# Patient Record
Sex: FEMALE | Race: WHITE | Employment: OTHER | URBAN - METROPOLITAN AREA
[De-identification: names, ages, dates, MRNs, and addresses within clinical notes are randomized per-mention and may not be internally consistent; named-entity substitution may affect disease eponyms.]

---

## 2019-06-12 ENCOUNTER — HOSPITAL ENCOUNTER (OUTPATIENT)
Dept: MRI IMAGING | Age: 74
Discharge: HOME | End: 2019-06-12
Attending: INTERNAL MEDICINE
Payer: MEDICARE

## 2019-06-12 DIAGNOSIS — H53.9 VISION CHANGES: ICD-10-CM

## 2019-06-12 DIAGNOSIS — R51.9 NONINTRACTABLE HEADACHE, UNSPECIFIED CHRONICITY PATTERN, UNSPECIFIED HEADACHE TYPE: ICD-10-CM

## 2019-06-12 PROCEDURE — 70551 MRI BRAIN STEM W/O DYE: CPT

## 2019-06-13 PROBLEM — G44.89 OTHER HEADACHE SYNDROME: Status: ACTIVE | Noted: 2019-06-13

## 2019-07-05 ENCOUNTER — HOSPITAL ENCOUNTER (OUTPATIENT)
Dept: MRI IMAGING | Age: 74
Discharge: HOME | End: 2019-07-05
Attending: NURSE PRACTITIONER
Payer: MEDICARE

## 2019-07-05 DIAGNOSIS — H53.9 VISION DISTURBANCE: ICD-10-CM

## 2019-07-05 DIAGNOSIS — R90.82 WHITE MATTER DISEASE: ICD-10-CM

## 2019-07-05 DIAGNOSIS — G44.1 OTHER VASCULAR HEADACHE: ICD-10-CM

## 2019-07-05 DIAGNOSIS — R42 DIZZINESS: ICD-10-CM

## 2019-07-05 PROCEDURE — 70544 MR ANGIOGRAPHY HEAD W/O DYE: CPT

## 2020-05-22 ENCOUNTER — HOSPITAL ENCOUNTER (OUTPATIENT)
Dept: MAMMOGRAPHY | Age: 75
Discharge: HOME | End: 2020-05-22
Attending: INTERNAL MEDICINE
Payer: MEDICARE

## 2020-05-22 DIAGNOSIS — Z78.0 POSTMENOPAUSAL STATE: ICD-10-CM

## 2020-05-22 PROCEDURE — 77080 DXA BONE DENSITY AXIAL: CPT

## 2021-07-14 ENCOUNTER — HOSPITAL ENCOUNTER (OUTPATIENT)
Dept: CT IMAGING | Age: 76
Discharge: HOME | End: 2021-07-14
Attending: PSYCHIATRY & NEUROLOGY
Payer: MEDICARE

## 2021-07-14 DIAGNOSIS — I67.1 BRAIN ANEURYSM: ICD-10-CM

## 2021-07-14 LAB — CREAT BLD-MCNC: 0.75 MG/DL (ref 0.8–1.5)

## 2021-07-14 PROCEDURE — 82565 ASSAY OF CREATININE: CPT

## 2021-07-14 PROCEDURE — 74011000636 HC RX REV CODE- 636: Performed by: PSYCHIATRY & NEUROLOGY

## 2021-07-14 PROCEDURE — 74011000258 HC RX REV CODE- 258: Performed by: PSYCHIATRY & NEUROLOGY

## 2021-07-14 PROCEDURE — 70496 CT ANGIOGRAPHY HEAD: CPT

## 2021-07-14 RX ORDER — SODIUM CHLORIDE 0.9 % (FLUSH) 0.9 %
10 SYRINGE (ML) INJECTION
Status: COMPLETED | OUTPATIENT
Start: 2021-07-14 | End: 2021-07-14

## 2021-07-14 RX ADMIN — IOPAMIDOL 50 ML: 755 INJECTION, SOLUTION INTRAVENOUS at 14:05

## 2021-07-14 RX ADMIN — SODIUM CHLORIDE 100 ML: 900 INJECTION, SOLUTION INTRAVENOUS at 14:06

## 2021-07-14 RX ADMIN — Medication 10 ML: at 14:06

## 2022-03-19 PROBLEM — H53.9 VISION DISTURBANCE: Status: ACTIVE | Noted: 2019-07-05

## 2022-03-20 PROBLEM — G44.89 OTHER HEADACHE SYNDROME: Status: ACTIVE | Noted: 2019-06-13

## 2022-03-20 PROBLEM — R42 DIZZINESS: Status: ACTIVE | Noted: 2019-07-05

## 2022-10-04 ENCOUNTER — OFFICE VISIT (OUTPATIENT)
Dept: NEUROLOGY | Age: 77
End: 2022-10-04
Payer: MEDICARE

## 2022-10-04 VITALS
DIASTOLIC BLOOD PRESSURE: 73 MMHG | HEART RATE: 70 BPM | HEIGHT: 64 IN | SYSTOLIC BLOOD PRESSURE: 109 MMHG | BODY MASS INDEX: 20.66 KG/M2 | WEIGHT: 121 LBS

## 2022-10-04 DIAGNOSIS — G89.29 CHRONIC NONINTRACTABLE HEADACHE, UNSPECIFIED HEADACHE TYPE: Primary | ICD-10-CM

## 2022-10-04 DIAGNOSIS — R51.9 CHRONIC NONINTRACTABLE HEADACHE, UNSPECIFIED HEADACHE TYPE: Primary | ICD-10-CM

## 2022-10-04 DIAGNOSIS — I67.1 BRAIN ANEURYSM: ICD-10-CM

## 2022-10-04 PROCEDURE — 1090F PRES/ABSN URINE INCON ASSESS: CPT | Performed by: PSYCHIATRY & NEUROLOGY

## 2022-10-04 PROCEDURE — 3017F COLORECTAL CA SCREEN DOC REV: CPT | Performed by: PSYCHIATRY & NEUROLOGY

## 2022-10-04 PROCEDURE — G8427 DOCREV CUR MEDS BY ELIG CLIN: HCPCS | Performed by: PSYCHIATRY & NEUROLOGY

## 2022-10-04 PROCEDURE — 4004F PT TOBACCO SCREEN RCVD TLK: CPT | Performed by: PSYCHIATRY & NEUROLOGY

## 2022-10-04 PROCEDURE — G8420 CALC BMI NORM PARAMETERS: HCPCS | Performed by: PSYCHIATRY & NEUROLOGY

## 2022-10-04 PROCEDURE — 1123F ACP DISCUSS/DSCN MKR DOCD: CPT | Performed by: PSYCHIATRY & NEUROLOGY

## 2022-10-04 PROCEDURE — G8484 FLU IMMUNIZE NO ADMIN: HCPCS | Performed by: PSYCHIATRY & NEUROLOGY

## 2022-10-04 PROCEDURE — G8399 PT W/DXA RESULTS DOCUMENT: HCPCS | Performed by: PSYCHIATRY & NEUROLOGY

## 2022-10-04 PROCEDURE — 99214 OFFICE O/P EST MOD 30 MIN: CPT | Performed by: PSYCHIATRY & NEUROLOGY

## 2022-10-04 ASSESSMENT — ENCOUNTER SYMPTOMS
GASTROINTESTINAL NEGATIVE: 1
RESPIRATORY NEGATIVE: 1
EYES NEGATIVE: 1

## 2022-10-04 NOTE — PROGRESS NOTES
[facility], [address]  [place], [address]  Phone: [phone] Fax [phone]  Dr. Zandra Isaacs      10/4/2022  Jorge Maldonado     Patient is referred by the following provider for consultation regarding as below:       I reviewed the available records and notes and have examined patient with the following findings:     Chief Complaint:  Chief Complaint   Patient presents with    Migraine    Follow-up          HPI: This is a right handed 78 y.o.  female very pleasant very appropriate patient who unfortunately has had years of bilateral frontal headaches that started in October 2018 she was seen Dr. Fran Stewart one of the nurse practitioners for a while. She was getting 3 a month lasting about 1 minute. She had an MRI and MRA done that was she was told was \"normal\" but it really was not. She has had them to the point only one time it lasted 45 minutes. And it was a severe pain and and she lost consciousness. But since then she she started Topamax 25 mg twice a day. And that resolved her headaches they were gone so she discontinued her Topamax. And should these headaches were gone from November 2021 to May of this year and she has had a total of 12 since then. So about 1 to 2/month. They can last anywhere from 30 seconds to 1 minute and then are completely resolved. Bilateral frontal headaches behind her eyes throbbing cute onset. With the visual changes that occur with it. Where the central vision seems to be blurred out. 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 And [facility] and found it in 2019 with no change. So 1 from 20 19-20 21 there was no change to this lesion. We will probably wait about 3 to 4 years and recheck it. But she is doing very well overall and is asymptomatic.   Her sed rate and C-reactive protein have been normal.    IMAGING REVIEW:  I REVIEWED PERTINENT  IMAGES AND REPORTS WITH THE PATIENT PERSONALLY, DIRECTLY AND FULLY. Past Medical History:  Past Medical History:   Diagnosis Date    Cancer ([facility] [address]     breast CA left breast    Psychiatric disorder     anxiety    Thyroid disease        Past Surgical History:  Past Surgical History:   Procedure Laterality Date    BREAST SURGERY      lumpectemy left breast    GYN      hysterectomy    MASTECTOMY      with reconstruction of the left breast       Social History:  Social History     Socioeconomic History    Marital status:      Spouse name: Not on file    Number of children: Not on file    Years of education: Not on file    Highest education level: Not on file   Occupational History    Not on file   Tobacco Use    Smoking status: Former     Packs/day: 0.50     Types: Cigarettes     Quit date: 4/10/1978     Years since quittin.5    Smokeless tobacco: Never   Substance and Sexual Activity    Alcohol use: Yes    Drug use: No    Sexual activity: Not on file   Other Topics Concern    Not on file   Social History Narrative    Not on file     Social Determinants of Health     Financial Resource Strain: Not on file   Food Insecurity: Not on file   Transportation Needs: Not on file   Physical Activity: Not on file   Stress: Not on file   Social Connections: Not on file   Intimate Partner Violence: Not on file   Housing Stability: Not on file       Family History:   Family History   Problem Relation Age of Onset    Emergence Delirium Neg Hx     Post-op Nausea/Vomiting Neg Hx     Cancer Mother         ovarian    Pseudochol.  Deficiency Neg Hx     Delayed Awakening Neg Hx     Heart Disease Father     Diabetes Brother     Malig Hypertherm Neg Hx     Diabetes Mother     Heart Disease Mother        Current Outpatient Medications on File Prior to Visit   Medication Sig Dispense Refill    MAGNESIUM PO Take by mouth      levothyroxine (SYNTHROID) 175 MCG tablet Take 175 mcg by mouth every morning (before breakfast) 1/2 tab once daily      PARoxetine (PAXIL) 20 MG tablet Take 20 mg by mouth daily      pravastatin (PRAVACHOL) 10 MG tablet TAKE 1 TABLET BY MOUTH NIGHTLY       No current facility-administered medications on file prior to visit. Allergies   Allergen Reactions    Strawberry Extract Hives    Sulfamethoxazole-Trimethoprim Other (See Comments)     flushing    Sulfa Antibiotics Other (See Comments) and Hives    Ofloxacin Other (See Comments)     Not allergic per pt       Review of Systems:  Review of Systems   Constitutional: Negative. HENT: Negative. Eyes: Negative. Respiratory: Negative. Cardiovascular: Negative. Gastrointestinal: Negative. Endocrine: Negative. Genitourinary: Negative. Musculoskeletal: Negative. Skin: Negative. Neurological:  Positive for headaches. Psychiatric/Behavioral: Negative. No flowsheet data found. No flowsheet data found. Vitals:    10/04/22 1241   BP: 109/73   Site: Left Upper Arm   Position: Sitting   Pulse: 70   Weight: 121 lb (54.9 kg)   Height: 5' 4\" (1.626 m)        Physical Exam  Constitutional:       Appearance: Normal appearance. HENT:      Head: Normocephalic and atraumatic. Eyes:      Extraocular Movements: Extraocular movements intact and EOM normal.      Pupils: Pupils are equal, round, and reactive to light. Cardiovascular:      Rate and Rhythm: Normal rate. Pulses: Normal pulses. Pulmonary:      Effort: Pulmonary effort is normal.   Abdominal:      General: Abdomen is flat. Neurological:      Mental Status: She is alert and oriented to person, place, and time. Gait: Gait is intact. Deep Tendon Reflexes:      Reflex Scores:       Tricep reflexes are 1+ on the right side and 1+ on the left side. Bicep reflexes are 1+ on the right side and 1+ on the left side. Brachioradialis reflexes are 1+ on the right side and 1+ on the left side.        Patellar reflexes are 1+ on the right side and 1+ on the left side. Achilles reflexes are 1+ on the right side and 1+ on the left side. Neurologic Exam     Mental Status   Oriented to person, place, and time. Attention: normal. Concentration: normal.   Level of consciousness: alert  Knowledge: good. Cranial Nerves     CN II   Visual fields full to confrontation. CN III, IV, VI   Pupils are equal, round, and reactive to light. Extraocular motions are normal.     CN VII   Facial expression full, symmetric. Motor Exam   Right arm tone: normal  Left arm tone: normal  Right leg tone: normal  Left leg tone: normal    Gait, Coordination, and Reflexes     Gait  Gait: normal    Tremor   Resting tremor: absent  Intention tremor: absent  Action tremor: absent    Reflexes   Right brachioradialis: 1+  Left brachioradialis: 1+  Right biceps: 1+  Left biceps: 1+  Right triceps: 1+  Left triceps: 1+  Right patellar: 1+  Left patellar: 1+  Right achilles: 1+  Left achilles: 1+        Assessment   Assessment / Plan: Juan Francisco Ruiz was seen today for migraine and follow-up. Diagnoses and all orders for this visit:    Chronic nonintractable headache, unspecified headache type again I would not treat the headaches. She gets maybe maximum 2 a month they can last anywhere from 30 seconds to 2 minutes and then they resolve. She does not need preventative medicine she does not need abortive medications. Nor is she looking for either of these. She did use Topamax for a while that she did like 25 mg twice a day but she does not want to restart that at this time    Brain aneurysm is actually most probably an infundibulum but they cannot tell the difference. There was no change from 1183-2789 we will probably wait and do another 3 years that we recheck with a CTA. Regards to her headaches I would not bother treating those since they are gone within 1 or 2 minutes longest.  And she is getting maybe 1 or 2/month.         The Diagnosis and differential diagnostic considerations, and Rx Tx were reviewed with the patient at length. No orders of the defined types were placed in this encounter. I have spent greater than 50% of visit discussing and counseling of patient  for treatment and diagnostic plan review. Total time 30 min     . Notes: Patient is to continue all medications as directed by prescribing physicians. Continuations on today's visit are made based on the patient's report of current medications.              Dr. William Deleon  Consultation Neurology, Neurodiagnostics and Neurotherapeutics  Neuroelectrophysiology, EEG, EMG  [facility]  [address] [place] [address]  Phone:  [phone]  Fax:   [phone]

## 2023-10-10 ENCOUNTER — OFFICE VISIT (OUTPATIENT)
Dept: NEUROLOGY | Age: 78
End: 2023-10-10
Payer: MEDICARE

## 2023-10-10 VITALS
OXYGEN SATURATION: 97 % | WEIGHT: 122.4 LBS | SYSTOLIC BLOOD PRESSURE: 144 MMHG | HEART RATE: 69 BPM | HEIGHT: 64 IN | BODY MASS INDEX: 20.9 KG/M2 | DIASTOLIC BLOOD PRESSURE: 87 MMHG

## 2023-10-10 DIAGNOSIS — I67.1 BRAIN ANEURYSM: Primary | ICD-10-CM

## 2023-10-10 DIAGNOSIS — R51.9 NONINTRACTABLE HEADACHE, UNSPECIFIED CHRONICITY PATTERN, UNSPECIFIED HEADACHE TYPE: ICD-10-CM

## 2023-10-10 PROCEDURE — 1090F PRES/ABSN URINE INCON ASSESS: CPT | Performed by: PSYCHIATRY & NEUROLOGY

## 2023-10-10 PROCEDURE — 1123F ACP DISCUSS/DSCN MKR DOCD: CPT | Performed by: PSYCHIATRY & NEUROLOGY

## 2023-10-10 PROCEDURE — 4004F PT TOBACCO SCREEN RCVD TLK: CPT | Performed by: PSYCHIATRY & NEUROLOGY

## 2023-10-10 PROCEDURE — G8427 DOCREV CUR MEDS BY ELIG CLIN: HCPCS | Performed by: PSYCHIATRY & NEUROLOGY

## 2023-10-10 PROCEDURE — G8484 FLU IMMUNIZE NO ADMIN: HCPCS | Performed by: PSYCHIATRY & NEUROLOGY

## 2023-10-10 PROCEDURE — G8420 CALC BMI NORM PARAMETERS: HCPCS | Performed by: PSYCHIATRY & NEUROLOGY

## 2023-10-10 PROCEDURE — G8399 PT W/DXA RESULTS DOCUMENT: HCPCS | Performed by: PSYCHIATRY & NEUROLOGY

## 2023-10-10 PROCEDURE — 99214 OFFICE O/P EST MOD 30 MIN: CPT | Performed by: PSYCHIATRY & NEUROLOGY

## 2023-10-10 ASSESSMENT — ENCOUNTER SYMPTOMS
RESPIRATORY NEGATIVE: 1
GASTROINTESTINAL NEGATIVE: 1
EYES NEGATIVE: 1
ALLERGIC/IMMUNOLOGIC NEGATIVE: 1

## 2023-10-10 NOTE — PROGRESS NOTES
[facility], [address], [address]  Phone: [phone] Fax [phone]  Dr. Magi Abbasi      10/10/2023  Justin Garcia     Patient is referred by the following provider for consultation regarding as below:       I reviewed the available records and notes and have examined patient with the following findings:     Chief Complaint:  Chief Complaint   Patient presents with    Follow-up          HPI: This is a right handed 70 y.o.  female who is very pleasant very appropriate patient who Dr. Roselyn Zamora was treating her in 2019 for frontal headaches she was getting about 3-month the MRI and MRA of her brain was essentially normal in hindsight they did see and go back and found that that supraclinoid possible aneurysm was available was present. Nonetheless the severe headaches resolved with Topamax 25 mg twice a day she discontinued that the headaches returned a little bit. Maybe 1 or 2/month but then last year she has not had a single headache in 1 year. She has been very active busy hiking and with groups. When she did have a headache she was getting 1 or 2/months that they be lasting 30 seconds to a minute in length bilateral frontal with visual changes. She has had sed rates and C-reactive proteins in the past that were normal.  She is stable comfortable but she is due for the recheck on the CTA. IMAGING REVIEW:  I REVIEWED PERTINENT  IMAGES AND REPORTS WITH THE PATIENT PERSONALLY, DIRECTLY AND FULLY.      Past Medical History:  Past Medical History:   Diagnosis Date    Cancer [facility])     breast CA left breast    Headache June 2021    Psychiatric disorder     anxiety    Thyroid disease        Past Surgical History:  Past Surgical History:   Procedure Laterality Date    BREAST SURGERY      lumpectemy left breast    GYN      hysterectomy    MASTECTOMY      with reconstruction of the left breast       Social History:  Social History     Socioeconomic History

## 2023-10-20 ENCOUNTER — HOSPITAL ENCOUNTER (OUTPATIENT)
Dept: CT IMAGING | Age: 78
End: 2023-10-20
Attending: PSYCHIATRY & NEUROLOGY
Payer: MEDICARE

## 2023-10-20 DIAGNOSIS — I67.1 BRAIN ANEURYSM: ICD-10-CM

## 2023-10-20 LAB — CREAT BLD-MCNC: 0.88 MG/DL (ref 0.8–1.5)

## 2023-10-20 PROCEDURE — 6360000004 HC RX CONTRAST MEDICATION: Performed by: PSYCHIATRY & NEUROLOGY

## 2023-10-20 PROCEDURE — 82565 ASSAY OF CREATININE: CPT

## 2023-10-20 PROCEDURE — 70496 CT ANGIOGRAPHY HEAD: CPT

## 2023-10-20 RX ADMIN — IOPAMIDOL 50 ML: 755 INJECTION, SOLUTION INTRAVENOUS at 14:28

## 2025-05-19 ENCOUNTER — OFFICE VISIT (OUTPATIENT)
Dept: NEUROLOGY | Age: 80
End: 2025-05-19
Payer: MEDICARE

## 2025-05-19 VITALS — SYSTOLIC BLOOD PRESSURE: 158 MMHG | DIASTOLIC BLOOD PRESSURE: 91 MMHG | OXYGEN SATURATION: 95 % | HEART RATE: 73 BPM

## 2025-05-19 DIAGNOSIS — G43.709 CHRONIC MIGRAINE WITHOUT AURA WITHOUT STATUS MIGRAINOSUS, NOT INTRACTABLE: Primary | ICD-10-CM

## 2025-05-19 DIAGNOSIS — I67.1 BRAIN ANEURYSM: ICD-10-CM

## 2025-05-19 PROCEDURE — 1123F ACP DISCUSS/DSCN MKR DOCD: CPT | Performed by: PSYCHIATRY & NEUROLOGY

## 2025-05-19 PROCEDURE — 1159F MED LIST DOCD IN RCRD: CPT | Performed by: PSYCHIATRY & NEUROLOGY

## 2025-05-19 PROCEDURE — 1160F RVW MEDS BY RX/DR IN RCRD: CPT | Performed by: PSYCHIATRY & NEUROLOGY

## 2025-05-19 PROCEDURE — 99214 OFFICE O/P EST MOD 30 MIN: CPT | Performed by: PSYCHIATRY & NEUROLOGY

## 2025-05-19 RX ORDER — FREMANEZUMAB-VFRM 225 MG/1.5ML
INJECTION SUBCUTANEOUS
Qty: 1 ADJUSTABLE DOSE PRE-FILLED PEN SYRINGE | Refills: 11 | Status: SHIPPED | OUTPATIENT
Start: 2025-05-19 | End: 2025-05-20

## 2025-05-19 ASSESSMENT — ENCOUNTER SYMPTOMS
EYES NEGATIVE: 1
RESPIRATORY NEGATIVE: 1
ALLERGIC/IMMUNOLOGIC NEGATIVE: 1
GASTROINTESTINAL NEGATIVE: 1

## 2025-05-19 NOTE — PROGRESS NOTES
[facility]  [address], [address]  [place] [address]  Phone: [phone] Fax [phone]  Dr. David Ogden      5/19/2025  Mirian Preston     Patient is referred by the following provider for consultation regarding as below:       I reviewed the available records and notes and have examined patient with the following findings:     Chief Complaint:  Chief Complaint   Patient presents with   • Follow-up          HPI: This is a right handed 80 y.o.  female who is very pleasant very appropriate patient unfortunately does suffer from some frontal headaches that I saw her in 2022-ish.  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  For its an infundibulum we have done an CTA in 2023 2021 with no changes working to repeat it because she did going to these headaches series.  On September of last year she started having a severe daily headache that was lancinating deep dull achy in the frontal head and sharp shooting.  It was continuous and continued straight till January 2025.  Almost 5 months straight of severe headache now is dropped down to about 2-3 times a week is not as severe but is frontal temporal behind the eyes dull achy and with any kind of increased intracranial pressure such as coughing sneezing or bearing down it gets much worse.  She did have a CAT scan of her head done that was unchanged normal.  Her eyes have been evaluated clearly by eye doctor there could have bothered her but her eyes are okay.  She ended up seeing Dr. Muñoz neurologist over [place] and is a excellent physician tried her on loratadine for sinus issues.  Unfortunately did not help.  She continues to have bilateral frontal temporal headaches 2-3 times a week deep dull achy increase and decrease with intermittent increased intracranial pressure such as bearing down coughing or sneezing.  They did check the patient for a

## 2025-05-20 RX ORDER — ERENUMAB-AOOE 70 MG/ML
INJECTION SUBCUTANEOUS
Qty: 1 ADJUSTABLE DOSE PRE-FILLED PEN SYRINGE | Refills: 11 | Status: SHIPPED | OUTPATIENT
Start: 2025-05-20

## 2025-05-21 ENCOUNTER — CLINICAL DOCUMENTATION (OUTPATIENT)
Dept: NEUROLOGY | Age: 80
End: 2025-05-21

## 2025-05-21 NOTE — PROGRESS NOTES
The patient chose not to do the once a month injectables but she is milind make an appointment for the MRI